# Patient Record
Sex: FEMALE | Race: WHITE | NOT HISPANIC OR LATINO | Employment: OTHER | URBAN - METROPOLITAN AREA
[De-identification: names, ages, dates, MRNs, and addresses within clinical notes are randomized per-mention and may not be internally consistent; named-entity substitution may affect disease eponyms.]

---

## 2022-01-15 ENCOUNTER — TRANSFERRED RECORDS (OUTPATIENT)
Dept: HEALTH INFORMATION MANAGEMENT | Facility: CLINIC | Age: 52
End: 2022-01-15

## 2022-05-20 ENCOUNTER — TRANSFERRED RECORDS (OUTPATIENT)
Dept: HEALTH INFORMATION MANAGEMENT | Facility: CLINIC | Age: 52
End: 2022-05-20

## 2023-04-26 ENCOUNTER — TRANSFERRED RECORDS (OUTPATIENT)
Dept: HEALTH INFORMATION MANAGEMENT | Facility: CLINIC | Age: 53
End: 2023-04-26
Payer: COMMERCIAL

## 2023-04-26 ENCOUNTER — MEDICAL CORRESPONDENCE (OUTPATIENT)
Dept: HEALTH INFORMATION MANAGEMENT | Facility: CLINIC | Age: 53
End: 2023-04-26
Payer: COMMERCIAL

## 2023-04-26 LAB
CHOLESTEROL (EXTERNAL): 281 MG/DL (ref 0–200)
CREATININE (EXTERNAL): 0.7 MG/DL (ref 0.7–1.4)
GFR ESTIMATED (EXTERNAL): >60 ML/MIN/1.73M2
GFR ESTIMATED (IF AFRICAN AMERICAN) (EXTERNAL): NORMAL ML/MIN/1.73M2
GLUCOSE (EXTERNAL): 81 MG/DL (ref 60–99)
HDLC SERPL-MCNC: 45 MG/DL (ref 35–65)
LDL CHOLESTEROL CALCULATED (EXTERNAL): 191 MG/DL (ref 0–130)
NON HDL CHOLESTEROL (EXTERNAL): ABNORMAL MG/DL
POTASSIUM (EXTERNAL): 4.3 MMOL/L (ref 3.5–5.1)
TRIGLYCERIDES (EXTERNAL): 222 MG/DL (ref 0–200)

## 2023-04-28 ENCOUNTER — TRANSCRIBE ORDERS (OUTPATIENT)
Dept: OTHER | Age: 53
End: 2023-04-28

## 2023-04-28 DIAGNOSIS — I34.1 MITRAL VALVE PROLAPSE: ICD-10-CM

## 2023-04-28 DIAGNOSIS — E78.5 DYSLIPIDEMIA: Primary | ICD-10-CM

## 2023-04-28 DIAGNOSIS — Z72.0 SMOKING TRYING TO QUIT: ICD-10-CM

## 2023-04-28 DIAGNOSIS — I47.10 SUPRAVENTRICULAR TACHYCARDIA (H): ICD-10-CM

## 2023-06-08 ENCOUNTER — OFFICE VISIT (OUTPATIENT)
Dept: CARDIOLOGY | Facility: CLINIC | Age: 53
End: 2023-06-08
Attending: NURSE PRACTITIONER
Payer: COMMERCIAL

## 2023-06-08 VITALS
DIASTOLIC BLOOD PRESSURE: 74 MMHG | HEART RATE: 68 BPM | RESPIRATION RATE: 16 BRPM | SYSTOLIC BLOOD PRESSURE: 106 MMHG | HEIGHT: 66 IN | BODY MASS INDEX: 22.98 KG/M2 | WEIGHT: 143 LBS

## 2023-06-08 DIAGNOSIS — Z72.0 SMOKING TRYING TO QUIT: ICD-10-CM

## 2023-06-08 DIAGNOSIS — I47.10 SUPRAVENTRICULAR TACHYCARDIA (H): ICD-10-CM

## 2023-06-08 DIAGNOSIS — I34.1 MITRAL VALVE PROLAPSE: ICD-10-CM

## 2023-06-08 DIAGNOSIS — E78.5 DYSLIPIDEMIA: ICD-10-CM

## 2023-06-08 PROCEDURE — 99204 OFFICE O/P NEW MOD 45 MIN: CPT | Performed by: INTERNAL MEDICINE

## 2023-06-08 RX ORDER — IBUPROFEN 200 MG
400 TABLET ORAL PRN
COMMUNITY

## 2023-06-08 RX ORDER — UBIDECARENONE 100 MG
200 CAPSULE ORAL DAILY
COMMUNITY

## 2023-06-08 RX ORDER — DIPHENHYDRAMINE HCL 25 MG
50 CAPSULE ORAL
COMMUNITY

## 2023-06-08 RX ORDER — METOPROLOL SUCCINATE 100 MG/1
100 TABLET, EXTENDED RELEASE ORAL DAILY
COMMUNITY

## 2023-06-08 RX ORDER — ALBUTEROL SULFATE 90 UG/1
2 AEROSOL, METERED RESPIRATORY (INHALATION) PRN
COMMUNITY
Start: 2021-10-06

## 2023-06-08 RX ORDER — METOPROLOL TARTRATE 50 MG
50 TABLET ORAL 2 TIMES DAILY
COMMUNITY

## 2023-06-08 RX ORDER — CALCIUM CARBONATE/VITAMIN D3 600 MG-10
1 TABLET ORAL DAILY
COMMUNITY

## 2023-06-08 NOTE — PROGRESS NOTES
[facility]   [address]  [place] [address]   [email or web address]   OFFICE: [phone]     CARDIOLOGY CLINIC NOTE     Thank you, Kalli William, for asking the [facility] Heart Care team to see Ms. Torie Torres to evaluate Consult (Palpitations, dizziness.)        Assessment/Recommendations   Assessment:    1. SVT with history of syncope - symptoms have been quiescent for many years.  2. Palpitations - recent palpitations sound most consistent with PVCs or PACs. No other associated symptoms. Will monitor for now.  3. Mitral valve prolapse with mild regurgitation  4. Tobacco abuse - advised smoking cessation.  5. Dizziness - sounds like it is more likely neurologic, possibly related to cervical spinal manipulation of     Plan:  1. Continue medications without changes.  2. If palpitations worsen would evaluate with cardiac event monitor.  3. Advised smoking cessation  4. Follow up in 1 year or sooner if needed.         History of Present Illness   Ms. Torie Torres is a 52 year old female with a significant past history of mitral valve prolapse, tobacco abuse, and SVT who presents to establish cardiac care.    Ms. Torres first developed supraventricular tachycardia in her early 20s.  This was characterized by sudden onset rapid palpitations that converted to what sounds like a long conversion pause and a very slow pulse rate.  This often resulted in syncope.  This last occurred over 10 years ago.  More recently she has had some other palpitations that are described as early beats followed by a pause and a really strong beat following that.  She notices this mostly when laying on her left side trying to sleep at night.  This is not associated with other symptoms.  She does experience some dizziness as well.  This dizziness started after having cervical spinal manipulation by a chiropractor.  This has been persistent for some months.  And it causes  her to list to the right when she walks.  It is not characterized by lightheadedness or presyncope.  She does exercise regularly without cardiorespiratory symptoms or limitations.  She also admits to smoking cigarettes on her regular basis.      Other than noted above, Ms. Torres denies any chest pain/pressure/tightness, shortness of breath at rest or with exertion, light headedness/dizziness, pre-syncope, syncope, lower extremity swelling, palpitations, paroxysmal nocturnal dyspnea (PND), or orthopnea.     Cardiac Problems and Cardiac Diagnostics     Most Recent Cardiac testing:  ECG dated 5/20/22 (personaly reviewed and interpreted): normal sinus rhythm. Normal ECG    ECHO (report reviewed):   TTE 10/5/2020 at [facility]  Summary     1. Left ventricular systolic function is normal with an estimated ejection fraction of 60-65%.     2. Right ventricle size and systolic function are normal.     3. There is mild mitral valve regurgitation.     4. Compared to prior report, prolapse is less evident.  MR is unchanged.          Medications  Allergies   Current Outpatient Medications   Medication Sig Dispense Refill     albuterol (PROAIR HFA) 108 (90 Base) MCG/ACT inhaler Inhale 2 puffs into the lungs as needed for shortness of breath or wheezing       calcium carbonate-vitamin D (CALTRATE) 600-10 MG-MCG per tablet Take 1 tablet by mouth daily       co-enzyme Q-10 100 MG CAPS capsule Take 200 mg by mouth daily       diphenhydrAMINE (BENADRYL) 25 MG capsule Take 50 mg by mouth nightly as needed       ibuprofen (ADVIL/MOTRIN) 200 MG tablet Take 400 mg by mouth as needed       metoprolol succinate ER (TOPROL XL) 100 MG 24 hr tablet Take 100 mg by mouth daily       metoprolol tartrate (LOPRESSOR) 50 MG tablet Take 50 mg by mouth 2 times daily       SOYBEAN PO Take 1 capsule by mouth daily        Allergies   Allergen Reactions     Atorvastatin Muscle Pain (Myalgia) and Other (See Comments)     Bupropion Unknown and Other (See  "Comments)     Other reaction(s): Crying  anger  Hx of anger when taking this med  Weird feelings       Cats Itching     Cephalexin      Clindamycin      Other reaction(s): Gastrointestinal  \"just didn't feel good\"       Duloxetine      Other reaction(s): Muscle Aches/Weakness     Duloxetine Hcl Unknown     Levofloxacin Other (See Comments) and Unknown     Reaction unknown       Penicillins Other (See Comments) and Swelling     Other reaction(s): *Unknown, Irregular heartbeat, Throat swelling  Tongue swelling, heart palpitations  Tongue swelling, heart palpitations.       Varenicline Other (See Comments)     Homicidal thoughts     Dogs Rash     Propoxyphene Rash        Physical Examination Review of Systems   Vitals: /74 (BP Location: Left arm, Patient Position: Sitting, Cuff Size: Adult Regular)   Pulse 68   Resp 16   Ht 1.676 m (5' 6\")   Wt 64.9 kg (143 lb)   BMI 23.08 kg/m    BMI= Body mass index is 23.08 kg/m .  Wt Readings from Last 3 Encounters:   06/08/23 64.9 kg (143 lb)       General: pleasant female. No acute distress.   HENT: external ears normal. Nares patent. Mucous membranes moist.  Eyes: perrla, extraocular muscles intact. No scleral icterus.   Neck: No JVD  Lungs: clear to auscultation  COR: regular rate and rhythm, No murmurs, rubs, or gallops  Abd: nondistended, BS present  Extrem: No edema        Please refer above for cardiac ROS details.       Past History   Past Medical History:   Past Medical History:   Diagnosis Date     COPD (chronic obstructive pulmonary disease) (H)      Depression      Dyslipidemia      Dysthymia      Mitral valve prolapse      SVT (supraventricular tachycardia) (H)         Past Surgical History:   Past Surgical History:   Procedure Laterality Date     HYSTERECTOMY          Family History:    Mother had heart failure  Father had aneurysm  Sister has pacemaker, and CAD  Brother had CABG    Social History:   Social History     Socioeconomic History     Marital " status:      Spouse name: Not on file     Number of children: Not on file     Years of education: Not on file     Highest education level: Not on file   Occupational History     Not on file   Tobacco Use     Smoking status: Every Day     Types: Cigarettes     Smokeless tobacco: Never   Vaping Use     Vaping status: Not on file   Substance and Sexual Activity     Alcohol use: Not on file     Drug use: Never     Sexual activity: Not on file   Other Topics Concern     Not on file   Social History Narrative     Not on file     Social Determinants of Health     Financial Resource Strain: Not on file   Food Insecurity: Not on file   Transportation Needs: Not on file   Physical Activity: Not on file   Stress: Not on file   Social Connections: Not on file   Intimate Partner Violence: Not on file   Housing Stability: Not on file            Lab Results    Chemistry/lipid CBC Cardiac Enzymes/BNP/TSH/INR   No results found for: CHOL, HDL, TRIG, CHOLHDL, CREATININE, BUN, NA, CO2 No results found for: WBC, HGB, HCT, MCV, PLT No results found for: CKTOTAL, CKMB, TROPONINI, BNP, TSH, INR

## 2023-06-08 NOTE — PATIENT INSTRUCTIONS
It was a pleasure to meet with you today.      Below is a summary of your visit.   Continue your medications without changes   I strongly encourage smoking cessation  Continue regular exercise.  Your dizziness seems most likely related to a neurologic process and not a heart problem.  Follow up with me in a year or sooner if needed       Please do not hesitate to call the [facility] with any questions or concerns at [phone].     Sincerely,

## 2025-02-18 ENCOUNTER — TRANSFERRED RECORDS (OUTPATIENT)
Dept: HEALTH INFORMATION MANAGEMENT | Facility: CLINIC | Age: 55
End: 2025-02-18

## 2025-03-24 ENCOUNTER — TRANSFERRED RECORDS (OUTPATIENT)
Dept: HEALTH INFORMATION MANAGEMENT | Facility: CLINIC | Age: 55
End: 2025-03-24
Payer: COMMERCIAL

## 2025-03-24 ENCOUNTER — MEDICAL CORRESPONDENCE (OUTPATIENT)
Dept: HEALTH INFORMATION MANAGEMENT | Facility: CLINIC | Age: 55
End: 2025-03-24
Payer: COMMERCIAL

## 2025-04-22 ENCOUNTER — OFFICE VISIT (OUTPATIENT)
Dept: CARDIOLOGY | Facility: CLINIC | Age: 55
End: 2025-04-22
Payer: COMMERCIAL

## 2025-04-22 VITALS
OXYGEN SATURATION: 98 % | SYSTOLIC BLOOD PRESSURE: 120 MMHG | HEART RATE: 77 BPM | BODY MASS INDEX: 23.97 KG/M2 | DIASTOLIC BLOOD PRESSURE: 70 MMHG | WEIGHT: 148.5 LBS | RESPIRATION RATE: 20 BRPM

## 2025-04-22 DIAGNOSIS — I34.1 MITRAL VALVE PROLAPSE: Primary | ICD-10-CM

## 2025-04-22 DIAGNOSIS — Z72.0 SMOKING TRYING TO QUIT: ICD-10-CM

## 2025-04-22 DIAGNOSIS — I49.3 PVC'S (PREMATURE VENTRICULAR CONTRACTIONS): ICD-10-CM

## 2025-04-22 DIAGNOSIS — I47.10 SUPRAVENTRICULAR TACHYCARDIA: ICD-10-CM

## 2025-04-22 PROCEDURE — 99214 OFFICE O/P EST MOD 30 MIN: CPT | Performed by: INTERNAL MEDICINE

## 2025-04-22 PROCEDURE — 3074F SYST BP LT 130 MM HG: CPT | Performed by: INTERNAL MEDICINE

## 2025-04-22 PROCEDURE — 3078F DIAST BP <80 MM HG: CPT | Performed by: INTERNAL MEDICINE

## 2025-04-22 PROCEDURE — G2211 COMPLEX E/M VISIT ADD ON: HCPCS | Performed by: INTERNAL MEDICINE

## 2025-04-22 RX ORDER — METOPROLOL SUCCINATE 25 MG/1
25 TABLET, EXTENDED RELEASE ORAL 2 TIMES DAILY
Qty: 180 TABLET | Refills: 3 | Status: SHIPPED | OUTPATIENT
Start: 2025-04-22

## 2025-04-22 RX ORDER — IPRATROPIUM BROMIDE AND ALBUTEROL SULFATE 2.5; .5 MG/3ML; MG/3ML
1 SOLUTION RESPIRATORY (INHALATION) 4 TIMES DAILY PRN
COMMUNITY
Start: 2025-02-18

## 2025-04-22 RX ORDER — PRENATAL VIT 91/IRON/FOLIC/DHA 28-975-200
COMBINATION PACKAGE (EA) ORAL
COMMUNITY
Start: 2024-11-29

## 2025-04-22 RX ORDER — INHALER, ASSIST DEVICES
SPACER (EA) MISCELLANEOUS
COMMUNITY
Start: 2025-02-18

## 2025-04-22 RX ORDER — NYSTATIN 100000 [USP'U]/ML
100000 SUSPENSION ORAL
COMMUNITY
Start: 2025-03-04 | End: 2025-04-22

## 2025-04-22 NOTE — PROGRESS NOTES
[facility]   [address]  [place] [address]   [email or web address]   OFFICE: [phone]     CARDIOLOGY CLINIC NOTE     Thank you, Kalli William, for asking the [facility] Heart Care team to see Ms. Torie Torres to evaluate Follow Up        Assessment/Recommendations   Assessment:    SVT with history of syncope - symptoms have been quiescent for many years.  Palpitations - recent palpitations sound most consistent with PVCs or PACs. Rare. And not problematic.  Mitral valve prolapse with mild regurgitation - has not been assessed in 5 years.   Tobacco abuse - she has set a quit date of 5/15. I encouraged her in this commitment.     Plan:  Decrease metoprolol succinate to 25 mg BID. Can further decrease to 25 mg daily in the evening after 3-4 weeks based on symptoms and blood pressure.  If palpitations worsen would evaluate with cardiac event monitor.  Echocardiogram to monitor mitral valve prolapse and regurgitation  Advised smoking cessation  Follow up in 1 year or sooner if needed.    The longitudinal plan of care for the diagnosis(es)/condition(s) as documented were addressed during this visit. Due to the added complexity in care, I will continue to support Torie in the subsequent management and with ongoing continuity of care.         History of Present Illness   Ms. Torie Torres is a 54 year old female with a significant past history of mitral valve prolapse, tobacco abuse, and SVT who presents in follow-up.    Ms. Torres first developed supraventricular tachycardia in her early 20s.  This was characterized by sudden onset rapid palpitations that converted to what sounds like a long conversion pause and a very slow pulse rate.  This often resulted in syncope.  This last occurred over 10 years ago.     Torie has recently started exercising. Feeling overall improved. Has occasionally missed some doses of metoprolol and notices more energy  and better overall mood and outlook on life when missing metoprolol. Asking to decrease dose or wean off. No increase in palpitations. Plans to quit smoking on 5/15 along with the rest of her household.    Other than noted above, Ms. Torres denies any chest pain/pressure/tightness, shortness of breath at rest or with exertion, light headedness/dizziness, pre-syncope, syncope, lower extremity swelling, palpitations, paroxysmal nocturnal dyspnea (PND), or orthopnea.     Cardiac Problems and Cardiac Diagnostics     Most Recent Cardiac testing:  ECG dated 3/24/25 (personaly reviewed and interpreted): normal sinus rhythm    ECHO (report reviewed):   TTE 10/5/2020 at [facility]  Summary     1. Left ventricular systolic function is normal with an estimated ejection fraction of 60-65%.     2. Right ventricle size and systolic function are normal.     3. There is mild mitral valve regurgitation.     4. Compared to prior report, prolapse is less evident.  MR is unchanged.          Medications  Allergies   Current Outpatient Medications   Medication Sig Dispense Refill    albuterol (PROAIR HFA) 108 (90 Base) MCG/ACT inhaler Inhale 2 puffs into the lungs as needed for shortness of breath or wheezing      diphenhydrAMINE (BENADRYL) 25 MG capsule Take 50 mg by mouth nightly as needed      ibuprofen (ADVIL/MOTRIN) 200 MG tablet Take 400 mg by mouth as needed      ipratropium - albuterol 0.5 mg/2.5 mg/3 mL (DUONEB) 0.5-2.5 (3) MG/3ML neb solution Take 1 vial by nebulization 4 times daily as needed.      metoprolol succinate ER (TOPROL XL) 25 MG 24 hr tablet Take 1 tablet (25 mg) by mouth 2 times daily. 180 tablet 3    metoprolol tartrate (LOPRESSOR) 50 MG tablet Take 50 mg by mouth 2 times daily      Spacer/Aero-Holding Chambers ([facility]) JARON USE WITH ALBUTEROL INHALER AS DIRECTED      calcium carbonate-vitamin D (CALTRATE) 600-10 MG-MCG per tablet Take 1 tablet by mouth daily (Patient not taking: Reported on 4/22/2025)    "   co-enzyme Q-10 100 MG CAPS capsule Take 200 mg by mouth daily (Patient not taking: Reported on 4/22/2025)      SOYBEAN PO Take 1 capsule by mouth daily (Patient not taking: Reported on 4/22/2025)      terbinafine (LAMISIL) 1 % external cream Apply topically nightly as needed. (Patient not taking: Reported on 4/22/2025)        Allergies   Allergen Reactions    Atorvastatin Muscle Pain (Myalgia) and Other (See Comments)    Bupropion Unknown and Other (See Comments)     Other reaction(s): Crying  anger  Hx of anger when taking this med  Weird feelings      Cats Itching    Cephalexin     Clindamycin      Other reaction(s): Gastrointestinal  \"just didn't feel good\"      Duloxetine      Other reaction(s): Muscle Aches/Weakness    Duloxetine Hcl Unknown    Levofloxacin Other (See Comments) and Unknown     Reaction unknown      Penicillins Other (See Comments) and Swelling     Other reaction(s): *Unknown, Irregular heartbeat, Throat swelling  Tongue swelling, heart palpitations  Tongue swelling, heart palpitations.      Varenicline Other (See Comments)     Homicidal thoughts    Dogs Rash    Propoxyphene Rash        Physical Examination Review of Systems   Vitals: /70 (BP Location: Right arm, Patient Position: Sitting, Cuff Size: Adult Large)   Pulse 77   Resp 20   Wt 67.4 kg (148 lb 8 oz)   SpO2 98%   BMI 23.97 kg/m    BMI= Body mass index is 23.97 kg/m .  Wt Readings from Last 3 Encounters:   04/22/25 67.4 kg (148 lb 8 oz)   06/08/23 64.9 kg (143 lb)       General: pleasant female. No acute distress.   Neck: No JVD  Lungs: clear to auscultation  COR: regular rate and rhythm, No murmurs, rubs, or gallops  Extrem: No edema        Please refer above for cardiac ROS details.       Past History   Past Medical History:   Past Medical History:   Diagnosis Date    COPD (chronic obstructive pulmonary disease) (H)     Depression     Dyslipidemia     Dysthymia     Mitral valve prolapse     SVT (supraventricular " "tachycardia)         Past Surgical History:   Past Surgical History:   Procedure Laterality Date    HYSTERECTOMY          Family History:    Mother had heart failure  Father had aneurysm  Sister has pacemaker, and CAD  Brother had CABG    Social History:   Social History     Socioeconomic History    Marital status:      Spouse name: Not on file    Number of children: Not on file    Years of education: Not on file    Highest education level: Not on file   Occupational History    Not on file   Tobacco Use    Smoking status: Every Day     Types: Cigarettes    Smokeless tobacco: Never   Substance and Sexual Activity    Alcohol use: Not on file    Drug use: Never    Sexual activity: Not on file   Other Topics Concern    Not on file   Social History Narrative    Not on file     Social Drivers of Health     Financial Resource Strain: Not on file   Food Insecurity: Not on file   Transportation Needs: Not on file   Physical Activity: Not on file   Stress: Not on file   Social Connections: Not on file   Interpersonal Safety: Not on file   Housing Stability: Not on file            Lab Results    Chemistry/lipid CBC Cardiac Enzymes/BNP/TSH/INR   Lab Results   Component Value Date    TRIG 108 03/24/2025    No results found for: \"WBC\", \"HGB\", \"HCT\", \"MCV\", \"PLT\" No results found for: \"CKTOTAL\", \"CKMB\", \"TROPONINI\", \"BNP\", \"TSH\", \"INR\"         "

## 2025-04-22 NOTE — LETTER
4/22/2025    Kalli Lema, COURTNEY  [facility] [address] [place] [id number]    RE: Torie DEWITT Brian       Dear Colleague,     I had the pleasure of seeing Torie Torres in the [facility].    [facility]   [address]  [place] [address]   [email or web address]   OFFICE: [phone]     CARDIOLOGY CLINIC NOTE     Thank you, Dr. Lema, Kalli DEWITT, for asking the [facility] Heart Care team to see Ms. Torie Torres to evaluate Follow Up        Assessment/Recommendations   Assessment:    SVT with history of syncope - symptoms have been quiescent for many years.  Palpitations - recent palpitations sound most consistent with PVCs or PACs. Rare. And not problematic.  Mitral valve prolapse with mild regurgitation - has not been assessed in 5 years.   Tobacco abuse - she has set a quit date of 5/15. I encouraged her in this commitment.     Plan:  Decrease metoprolol succinate to 25 mg BID. Can further decrease to 25 mg daily in the evening after 3-4 weeks based on symptoms and blood pressure.  If palpitations worsen would evaluate with cardiac event monitor.  Echocardiogram to monitor mitral valve prolapse and regurgitation  Advised smoking cessation  Follow up in 1 year or sooner if needed.    The longitudinal plan of care for the diagnosis(es)/condition(s) as documented were addressed during this visit. Due to the added complexity in care, I will continue to support Torie in the subsequent management and with ongoing continuity of care.         History of Present Illness   Ms. Torie Torres is a 54 year old female with a significant past history of mitral valve prolapse, tobacco abuse, and SVT who presents in follow-up.    Ms. Torres first developed supraventricular tachycardia in her early 20s.  This was characterized by sudden onset rapid palpitations that converted to what sounds like a long conversion pause and a very slow  pulse rate.  This often resulted in syncope.  This last occurred over 10 years ago.     Torie has recently started exercising. Feeling overall improved. Has occasionally missed some doses of metoprolol and notices more energy and better overall mood and outlook on life when missing metoprolol. Asking to decrease dose or wean off. No increase in palpitations. Plans to quit smoking on 5/15 along with the rest of her household.    Other than noted above, Ms. Torres denies any chest pain/pressure/tightness, shortness of breath at rest or with exertion, light headedness/dizziness, pre-syncope, syncope, lower extremity swelling, palpitations, paroxysmal nocturnal dyspnea (PND), or orthopnea.     Cardiac Problems and Cardiac Diagnostics     Most Recent Cardiac testing:  ECG dated 3/24/25 (personaly reviewed and interpreted): normal sinus rhythm    ECHO (report reviewed):   TTE 10/5/2020 at [facility]  Summary     1. Left ventricular systolic function is normal with an estimated ejection fraction of 60-65%.     2. Right ventricle size and systolic function are normal.     3. There is mild mitral valve regurgitation.     4. Compared to prior report, prolapse is less evident.  MR is unchanged.          Medications  Allergies   Current Outpatient Medications   Medication Sig Dispense Refill     albuterol (PROAIR HFA) 108 (90 Base) MCG/ACT inhaler Inhale 2 puffs into the lungs as needed for shortness of breath or wheezing       diphenhydrAMINE (BENADRYL) 25 MG capsule Take 50 mg by mouth nightly as needed       ibuprofen (ADVIL/MOTRIN) 200 MG tablet Take 400 mg by mouth as needed       ipratropium - albuterol 0.5 mg/2.5 mg/3 mL (DUONEB) 0.5-2.5 (3) MG/3ML neb solution Take 1 vial by nebulization 4 times daily as needed.       metoprolol succinate ER (TOPROL XL) 25 MG 24 hr tablet Take 1 tablet (25 mg) by mouth 2 times daily. 180 tablet 3     metoprolol tartrate (LOPRESSOR) 50 MG tablet Take 50 mg by mouth 2 times daily    "    Spacer/Aero-Holding Chambers ([facility]) JARON USE WITH ALBUTEROL INHALER AS DIRECTED       calcium carbonate-vitamin D (CALTRATE) 600-10 MG-MCG per tablet Take 1 tablet by mouth daily (Patient not taking: Reported on 4/22/2025)       co-enzyme Q-10 100 MG CAPS capsule Take 200 mg by mouth daily (Patient not taking: Reported on 4/22/2025)       SOYBEAN PO Take 1 capsule by mouth daily (Patient not taking: Reported on 4/22/2025)       terbinafine (LAMISIL) 1 % external cream Apply topically nightly as needed. (Patient not taking: Reported on 4/22/2025)        Allergies   Allergen Reactions     Atorvastatin Muscle Pain (Myalgia) and Other (See Comments)     Bupropion Unknown and Other (See Comments)     Other reaction(s): Crying  anger  Hx of anger when taking this med  Weird feelings       Cats Itching     Cephalexin      Clindamycin      Other reaction(s): Gastrointestinal  \"just didn't feel good\"       Duloxetine      Other reaction(s): Muscle Aches/Weakness     Duloxetine Hcl Unknown     Levofloxacin Other (See Comments) and Unknown     Reaction unknown       Penicillins Other (See Comments) and Swelling     Other reaction(s): *Unknown, Irregular heartbeat, Throat swelling  Tongue swelling, heart palpitations  Tongue swelling, heart palpitations.       Varenicline Other (See Comments)     Homicidal thoughts     Dogs Rash     Propoxyphene Rash        Physical Examination Review of Systems   Vitals: /70 (BP Location: Right arm, Patient Position: Sitting, Cuff Size: Adult Large)   Pulse 77   Resp 20   Wt 67.4 kg (148 lb 8 oz)   SpO2 98%   BMI 23.97 kg/m    BMI= Body mass index is 23.97 kg/m .  Wt Readings from Last 3 Encounters:   04/22/25 67.4 kg (148 lb 8 oz)   06/08/23 64.9 kg (143 lb)       General: pleasant female. No acute distress.   Neck: No JVD  Lungs: clear to auscultation  COR: regular rate and rhythm, No murmurs, rubs, or gallops  Extrem: No edema        Please refer above for cardiac " "ROS details.       Past History   Past Medical History:   Past Medical History:   Diagnosis Date     COPD (chronic obstructive pulmonary disease) (H)      Depression      Dyslipidemia      Dysthymia      Mitral valve prolapse      SVT (supraventricular tachycardia)         Past Surgical History:   Past Surgical History:   Procedure Laterality Date     HYSTERECTOMY          Family History:    Mother had heart failure  Father had aneurysm  Sister has pacemaker, and CAD  Brother had CABG    Social History:   Social History     Socioeconomic History     Marital status:      Spouse name: Not on file     Number of children: Not on file     Years of education: Not on file     Highest education level: Not on file   Occupational History     Not on file   Tobacco Use     Smoking status: Every Day     Types: Cigarettes     Smokeless tobacco: Never   Substance and Sexual Activity     Alcohol use: Not on file     Drug use: Never     Sexual activity: Not on file   Other Topics Concern     Not on file   Social History Narrative     Not on file     Social Drivers of Health     Financial Resource Strain: Not on file   Food Insecurity: Not on file   Transportation Needs: Not on file   Physical Activity: Not on file   Stress: Not on file   Social Connections: Not on file   Interpersonal Safety: Not on file   Housing Stability: Not on file            Lab Results    Chemistry/lipid CBC Cardiac Enzymes/BNP/TSH/INR   Lab Results   Component Value Date    TRIG 108 03/24/2025    No results found for: \"WBC\", \"HGB\", \"HCT\", \"MCV\", \"PLT\" No results found for: \"CKTOTAL\", \"CKMB\", \"TROPONINI\", \"BNP\", \"TSH\", \"INR\"             Thank you for allowing me to participate in the care of your patient.      Sincerely,     Riley Adams MD     [facility]  cc:   Kalli Lema, COURTNEY  [facility]  [address] [place] [id number]      "

## 2025-04-22 NOTE — PATIENT INSTRUCTIONS
"It was a pleasure to meet with you today.      Below is a summary of your visit.   We will change your metoprolol to metoprolol succinate at 25 mg twice daily. After 3-4 weeks you can decrease this to once daily if you wish. Watch for increased palpitations or increased blood pressure.   Continue your other medications without changes.   I encourage continued regular exercise  I also applaud you for committing to stopping smoking.   Follow up in a year.    Consider reading or listening to the book, \"Outlive: the Science and Art of Longevity\" by Dr. Don Gregorio.    We will call you to inform you of your test or procedure results within 3 business days of the test being performed.  If you do not hear from our office with the test results within 1 week please do not hesitate to call asking for these results.     Please do not hesitate to call the [facility] with any questions or concerns at [phone]. You can also reach my nurse, Fauzia, at [phone].    Sincerely,       "

## 2025-04-29 ENCOUNTER — TELEPHONE (OUTPATIENT)
Dept: CARDIOLOGY | Facility: CLINIC | Age: 55
End: 2025-04-29
Payer: COMMERCIAL

## 2025-04-29 NOTE — TELEPHONE ENCOUNTER
[facility] Call Center    Phone Message    May a detailed message be left on voicemail: yes     Reason for Call: Symptoms or Concerns     Current symptom or concern: Pt's metroplol was decreased to 25mg at 4/22/25 visit w/ Dr. Adams. Pt reports since med decrease her bp has been really low. She reports an initial BP reading of 93/61 about an hour and half ago and a 2nd BP reading of 97/67 about 20 mins ago. Please advise and call patient back.    Symptoms have been present for:  1 week(s)    Has patient previously been seen for this? Yes    By : Riley Adams MD    Are there any new or worsening symptoms? L arm gets numb easier than normal and her heart feels like it's pounding a little harder    Action Taken: Other: CARDIO    Travel Screening: Not Applicable     Date of Service:

## 2025-04-30 NOTE — TELEPHONE ENCOUNTER
Called and spoke with patient and , pt back at [facility] due to lower bp 87/53. Pt now receiving IV fluids, may be admitted.    stated she doesn't want to stay overnight and they are recommending follow-up with PCP and cardiology. Explained we must wait until we know if pt admitted or not. Advised patient and  to call and touch base once discharged.  verbalized understanding.   -alexis  ________________  ----- Message -----  From: Riley Adams MD  Sent: 4/30/2025   1:41 PM CDT  To: Joaquina Dumont RN    Yes, correct.  [facility]INCENT  ----- Message -----  From: Joaquina Dumont RN  Sent: 4/30/2025  12:29 PM CDT  To: Riley Adams MD    ----- Message from Joaquina Dumont RN sent at 4/30/2025 12:29 PM CDT -----  The metoprolol succinate, correct? Okay to remove metoprolol tartrate from med list?  Thank you.  -alexis

## 2025-04-30 NOTE — TELEPHONE ENCOUNTER
Spoke with patient, she explains that since switching from metoprolol tartrate 50mg BID to metoprolol succinate 25mg BID per recent OV pt reports having some lower blood pressures, intermittent dizziness with activity and fatigue.     Pt reports going to [facility] yesterday due to these symptoms as well as left side numbness (CT neg) and was instructed to hold metoprolol succinate last night and this morning and to receive further instructions from .     This morning pt reports feeling a little better, denies dizziness near syncope/syncope, or chest pain. Pt does note slight palpitations this morning. Pt notes her blood pressure is slightly lower this morning.   Encouraged patient to hydrate and continue to monitor until call back with recommendations. Advised pt to follow-up with PCP regarding recommendation for MRI. Pt verbalized understanding.-alexis

## 2025-04-30 NOTE — TELEPHONE ENCOUNTER
----- Message -----  From: Riley Adams MD  Sent: 4/30/2025  11:40 AM CDT  To: Joaquina Dumont, RN    Okay to stop metoprolol. If heart pounding worsens or she develops tachycardia with HR >100, then can resume at 25 mg once daily for 2-3 weeks then stop.  A sensation of heart pounding with weaning off or or stopping metoprolol is normal.    JKH  ----- Message -----  From: Joaquina Dumont RN  Sent: 4/30/2025  10:06 AM CDT  To: Riley Adams MD    ----- Message from Joaquina Dumont RN sent at 4/30/2025 10:06 AM CDT -----  Please see notes and advise. Metoprolol on med list had discrepancies.    Thank you.  -alexis

## 2025-05-01 NOTE — TELEPHONE ENCOUNTER
"Received message from patient noting she was not admitted to [facility] yesterday, noted hospitalist recommending CT Angiogram Chest, work up for subclavian steal syndrome, and echocardiogram.     Spoke with patient she notes she feels okay today, denies checking her bp or hr, states does not have bp cuff. Pt states last dose of metoprolol was 4/29 am. Pt reports a \"little dizziness\" on and off, denies near syncope. Notes heart pounding some this morning some but relaxed now. Pt denies chest pain. Pt reports will be seeing PCP tomorrow.     Explained 's recommendations from yesterday- will confirm okay to move forward with plan, noted will update J[facility] and call back if any additional recommendations. Pt verbalized understanding.-alexis  "

## 2025-05-01 NOTE — TELEPHONE ENCOUNTER
Spoke with patient and updated her of 's response. Pt verbalize understanding. Pt noted having plenty of the metoprolol succinate at this time if needed per [facility] recommendations. Med list updated. Advised pt to call if any new or worsening symptoms. Pt thanks team.-alexis   _____________  ----- Message -----  From: Riley Adams MD  Sent: 5/1/2025   9:35 AM CDT  To: Joaquina Dumont, RN    Yes, okay to move forward with the plan as outlined previously  Thank you  VINCENT  ----- Message -----  From: Joaquina Dumont RN  Sent: 5/1/2025   9:31 AM CDT  To: Riley Adams MD    ----- Message from Joaquina Dumont RN sent at 5/1/2025  9:31 AM CDT -----  Please see notes, okay to move forward with recommendations for metoprolol succinate yesterday?   Thank you.  -alexis

## 2025-05-02 ENCOUNTER — TRANSFERRED RECORDS (OUTPATIENT)
Dept: HEALTH INFORMATION MANAGEMENT | Facility: CLINIC | Age: 55
End: 2025-05-02
Payer: COMMERCIAL

## 2025-05-07 ENCOUNTER — TELEPHONE (OUTPATIENT)
Dept: CARDIOLOGY | Facility: CLINIC | Age: 55
End: 2025-05-07
Payer: COMMERCIAL

## 2025-05-07 ENCOUNTER — ORDERS ONLY (AUTO-RELEASED) (OUTPATIENT)
Dept: CARDIOLOGY | Facility: CLINIC | Age: 55
End: 2025-05-07
Payer: COMMERCIAL

## 2025-05-07 DIAGNOSIS — R00.2 PALPITATIONS: ICD-10-CM

## 2025-05-07 DIAGNOSIS — I49.3 PVC'S (PREMATURE VENTRICULAR CONTRACTIONS): ICD-10-CM

## 2025-05-07 DIAGNOSIS — R00.2 PALPITATIONS: Primary | ICD-10-CM

## 2025-05-07 DIAGNOSIS — I47.10 SUPRAVENTRICULAR TACHYCARDIA: ICD-10-CM

## 2025-05-07 RX ORDER — METOPROLOL SUCCINATE 25 MG/1
25 TABLET, EXTENDED RELEASE ORAL 2 TIMES DAILY
COMMUNITY
End: 2025-05-07 | Stop reason: ALTCHOICE

## 2025-05-07 RX ORDER — METOPROLOL TARTRATE 50 MG
50 TABLET ORAL 2 TIMES DAILY
Qty: 90 TABLET | Refills: 2 | Status: SHIPPED | OUTPATIENT
Start: 2025-05-07

## 2025-05-07 NOTE — TELEPHONE ENCOUNTER
Spoke with patient and  and updated them of 's response. Pt verbalized understanding and reports she has been having frequent palpitations. Rx sent to patient's preferred pharmacy. Zio monitor ordered, address confirmed. Instructions provided on application, customer service number for [employer], tracking symptoms and how to return monitor.-alexis  ______________  ----- Message -----  From: Riley Adams MD  Sent: 5/7/2025   4:34 PM CDT  To: Joaquina Dumont RN    She can go back to metoprolol tartrate at her previous dose (50 mg BID).   If she is having frequent palpitations then we should evaluate with a 1 week Zio monitor.  VINCENT  ----- Message -----  From: Joaquina Dumont RN  Sent: 5/7/2025   4:14 PM CDT  To: Riley Adams MD    ----- Message from Joaqiuna Dumont RN sent at 5/7/2025  4:14 PM CDT -----    Please see notes and advise.  -alexis

## 2025-05-07 NOTE — TELEPHONE ENCOUNTER
Received voicemail from patient's  stating patient is still uncomfortable, noting she feeling heart beat in her ears and neck, with muscle cramping and feeling tense.  Notes pt saw PCP on 5/2, and awaiting vascular consult.     Spoke with patient's , he explained that patient is going to be seen by vascular surgery this coming Friday and echo scheduled Monday in [place]. Notes pt BP on right arm has been about 113-118/72 with heart rates around 80bpm, left arm has been lower. Notes at PCP follow-up on 5/2 pt instructed to restart metoprolol succinate 25mg BID.   Pt wondering if she should go back to the metoprolol tartrate because she felt better on it.     Pt noting continued numbness and tingling in left arm with intermittent cramping across chest and down.   notes pt having a lot of anxiety about symptoms. Reports patient has had some intermittent dizziness with walking, states she has had some increased weakness and a near fall.  notes patient has history of intermittent dizziness and vertigo.    Pt denies active chest pain, palpitations, and SOB with activity or rest.     Advised pt and  if near fall or syncope or worsening symptoms for patient to be evaluated at the hospital.  verbalized understanding and agrees, request update be sent to  for recommendations.-alexis

## 2025-05-12 ENCOUNTER — ANCILLARY PROCEDURE (OUTPATIENT)
Dept: CARDIOLOGY | Facility: CLINIC | Age: 55
End: 2025-05-12
Attending: INTERNAL MEDICINE
Payer: COMMERCIAL

## 2025-05-12 DIAGNOSIS — I34.1 MITRAL VALVE PROLAPSE: ICD-10-CM

## 2025-05-12 DIAGNOSIS — I47.10 SUPRAVENTRICULAR TACHYCARDIA: ICD-10-CM

## 2025-05-12 LAB — LVEF ECHO: NORMAL

## 2025-05-12 PROCEDURE — 93306 TTE W/DOPPLER COMPLETE: CPT | Performed by: INTERNAL MEDICINE

## 2025-05-19 ENCOUNTER — TELEPHONE (OUTPATIENT)
Dept: CARDIOLOGY | Facility: CLINIC | Age: 55
End: 2025-05-19
Payer: COMMERCIAL

## 2025-05-19 NOTE — TELEPHONE ENCOUNTER
Received message from patient's , Raghu asking if echocardiogram has been reviewed.     Called and spoke with Raghu(received verbal consent to communicate 4/30), explained that we are awaiting 's review of the echocardiogram and will be calling with results and recommendations once completed.  Raghu verbalized understanding and thanks team.-alexis

## 2025-05-20 ENCOUNTER — RESULTS FOLLOW-UP (OUTPATIENT)
Dept: CARDIOLOGY | Facility: CLINIC | Age: 55
End: 2025-05-20

## 2025-06-05 LAB — CV ZIO PRELIM RESULTS: NORMAL

## 2025-06-09 ENCOUNTER — RESULTS FOLLOW-UP (OUTPATIENT)
Dept: CARDIOLOGY | Facility: CLINIC | Age: 55
End: 2025-06-09